# Patient Record
Sex: MALE | Race: BLACK OR AFRICAN AMERICAN | Employment: OTHER | URBAN - METROPOLITAN AREA
[De-identification: names, ages, dates, MRNs, and addresses within clinical notes are randomized per-mention and may not be internally consistent; named-entity substitution may affect disease eponyms.]

---

## 2020-08-12 ENCOUNTER — HOSPITAL ENCOUNTER (EMERGENCY)
Age: 65
Discharge: HOME | End: 2020-08-12
Attending: EMERGENCY MEDICINE
Payer: MEDICAID

## 2020-08-12 VITALS
WEIGHT: 200 LBS | DIASTOLIC BLOOD PRESSURE: 100 MMHG | HEART RATE: 107 BPM | RESPIRATION RATE: 16 BRPM | HEIGHT: 74 IN | TEMPERATURE: 98 F | BODY MASS INDEX: 25.67 KG/M2 | SYSTOLIC BLOOD PRESSURE: 139 MMHG | OXYGEN SATURATION: 98 %

## 2020-08-12 DIAGNOSIS — M54.41 ACUTE RIGHT-SIDED LOW BACK PAIN WITH RIGHT-SIDED SCIATICA: Primary | ICD-10-CM

## 2020-08-12 DIAGNOSIS — R03.0 ELEVATED BLOOD PRESSURE READING: ICD-10-CM

## 2020-08-12 PROCEDURE — 99282 EMERGENCY DEPT VISIT SF MDM: CPT

## 2020-08-12 RX ORDER — METHOCARBAMOL 500 MG/1
500 TABLET, FILM COATED ORAL 4 TIMES DAILY
Qty: 12 TAB | Refills: 0 | Status: SHIPPED | OUTPATIENT
Start: 2020-08-12

## 2020-08-12 RX ORDER — NAPROXEN 500 MG/1
500 TABLET ORAL 2 TIMES DAILY WITH MEALS
Qty: 20 TAB | Refills: 0 | Status: SHIPPED | OUTPATIENT
Start: 2020-08-12 | End: 2020-08-22

## 2020-08-12 NOTE — ED PROVIDER NOTES
EMERGENCY DEPARTMENT HISTORY AND PHYSICAL EXAM    7:14 PM      Date: 8/12/2020  Patient Name: Bogdan Levi    History of Presenting Illness     Chief Complaint   Patient presents with    Back Pain    Hip Pain       History Provided By: Patient    Chief Complaint: right low back pain, right hip pain  Duration: 2-3 Days  Timing:  Acute  Location:   Quality: Aching  Severity: Mild  Modifying Factors: none  Associated Symptoms: denies any other associated signs or symptoms      Additional History (Context):Adrien Gonzalez is a 75 y.o. male who presents to the emergency department for evaluation of right lower back pain and right hip pain for the past 2 or 3 days. Patient states he was moving some heavy furniture around and the pain began shortly after that. Pain is worse with bending and ambulating. Patient reports no history of similar pains. No associated perianal numbness or incontinence of bowels or bladder. No associated fevers or chills, nausea or vomiting, focal weakness or numbness, abdominal pain, URI, cough, chest pain, shortness of breath, or urinary symptoms. No history of diabetes or IV drug use. Patient states he feels well otherwise. No treatments prior to arrival.     PCP:  Tin Reddy MD      Current Outpatient Medications   Medication Sig Dispense Refill    methocarbamoL (Robaxin) 500 mg tablet Take 1 Tab by mouth four (4) times daily. 12 Tab 0    naproxen (Naprosyn) 500 mg tablet Take 1 Tab by mouth two (2) times daily (with meals) for 10 days. 20 Tab 0       Past History     Past Medical History:  History reviewed. No pertinent past medical history. Past Surgical History:  History reviewed. No pertinent surgical history. Family History:  History reviewed. No pertinent family history.     Social History:  Social History     Tobacco Use    Smoking status: Current Every Day Smoker    Smokeless tobacco: Never Used   Substance Use Topics    Alcohol use: Not on file    Drug use: Not on file       Allergies: Allergies   Allergen Reactions    Aspirin Unknown (comments)         Review of Systems     Review of Systems   Constitutional: Negative for chills and fever. HENT: Negative for congestion, rhinorrhea and sore throat. Respiratory: Negative for cough and shortness of breath. Cardiovascular: Negative for chest pain. Gastrointestinal: Negative for abdominal pain, blood in stool, constipation, diarrhea, nausea and vomiting. Genitourinary: Negative for dysuria, frequency and hematuria. Musculoskeletal: Positive for back pain. Negative for myalgias. Skin: Negative for rash and wound. Neurological: Negative for dizziness and headaches. All other systems reviewed and are negative. Physical Exam     Visit Vitals  BP (!) 139/100 (BP 1 Location: Right arm, BP Patient Position: At rest)   Pulse (!) 107   Temp 98 °F (36.7 °C)   Resp 16   Ht 6' 2\" (1.88 m)   Wt 90.7 kg (200 lb)   SpO2 98%   BMI 25.68 kg/m²       Physical Exam  Vitals signs and nursing note reviewed. Constitutional:       General: He is not in acute distress. Appearance: He is well-developed. He is not diaphoretic. Comments: Well-appearing, nontoxic   HENT:      Head: Normocephalic and atraumatic. Eyes:      Conjunctiva/sclera: Conjunctivae normal.   Neck:      Musculoskeletal: Normal range of motion and neck supple. Cardiovascular:      Rate and Rhythm: Regular rhythm. Tachycardia present. Heart sounds: Normal heart sounds. Pulmonary:      Effort: Pulmonary effort is normal. No respiratory distress. Breath sounds: Normal breath sounds. Chest:      Chest wall: No tenderness. Musculoskeletal: Normal range of motion. General: No swelling, tenderness, deformity or signs of injury. Right lower leg: No edema. Left lower leg: No edema. Comments: Positive straight leg raise on right. Negative straight leg raise on left.   Right lower back, right hip, and right buttock are tender to palpation. No overlying erythema, edema, ecchymosis, or rash. Patient ambulatory, moving bilateral upper and lower extremities with full range of motion. Slightly decreased strength secondary to pain in the right lower back, hip, and buttock. Intact distal neurovascular status   Skin:     General: Skin is warm and dry. Neurological:      Mental Status: He is alert and oriented to person, place, and time. Diagnostic Study Results     Labs -  No results found for this or any previous visit (from the past 12 hour(s)). Radiologic Studies -   No results found. Medical Decision Making   I am the first provider for this patient. I reviewed the vital signs, available nursing notes, past medical history, past surgical history, family history and social history. Vital Signs-Reviewed the patient's vital signs. Pulse Oximetry Analysis -  98% on room air (Interpretation)    Records Reviewed: Nursing Notes and Old Medical Records (Time of Review: 7:14 PM)    ED Course: Progress Notes, Reevaluation, and Consults:    Provider Notes (Medical Decision Making):   Differential Diagnosis: Musculoskeletal pain, myofascial strain/sprain, muscle spasm, spondylolisthesis, spondylosis, DJD, OA, sciatica    Plan:  Pt presents ambulatory, moving BLE in NAD with elevated diastolic blood pressure and mild tachycardia. Otherwise unremarkable vitals. He appears well and nontoxic. No red flags such as saddle paresthesias, weakness, bladder/bowel dysfunction, IVDA, DM, or fever - very low suspicion for epidural abscess, cauda equina, or spinal cord injury. Do not feel that emergent imaging is warranted at this time. Exam and HPI consistent with lumbosacral radiculopathy/sciatica, likely exacerbated by recent heavy lifting. Will trial outpatient Naprosyn and Robaxin. Patient reports aspirin makes him feel bad, but it is not a true allergy.   Explained to patient that this pain may take a few weeks to completely resolve. At this time, patient is stable and appropriate for discharge home. Patient demonstrates understanding of current diagnoses and is in agreement with the treatment plan. They are advised that while the likelihood of serious underlying condition is low at this point given the evaluation performed today, we cannot fully rule it out. They are advised to immediately return with any new symptoms or worsening of current condition. All questions have been answered. Patient is given educational material regarding their diagnoses, including danger symptoms and when to return to the ED. Diagnosis     Clinical Impression:   1. Acute right-sided low back pain with right-sided sciatica    2. Elevated blood pressure reading        Disposition:DC Home    Follow-up Information     Follow up With Specialties Details Why Contact Info    Bry Verduzco MD Family Medicine Call in 2 days  [facility] [address] [place] [address] EMERGENCY DEPT Emergency Medicine Go to As needed, If symptoms worsen [address]  [phone]           Patient's Medications   Start Taking    METHOCARBAMOL (ROBAXIN) 500 MG TABLET    Take 1 Tab by mouth four (4) times daily. NAPROXEN (NAPROSYN) 500 MG TABLET    Take 1 Tab by mouth two (2) times daily (with meals) for 10 days. Continue Taking    No medications on file   These Medications have changed    No medications on file   Stop Taking    No medications on file     _______________________________    This note was dictated utilizing voice recognition software which may lead to typographical errors. I apologize in advance if the situation occurs. If questions arise please do not hesitate to contact me or call our department.   Sandi Dinh PA-C

## 2020-08-12 NOTE — DISCHARGE INSTRUCTIONS
Patient Education     Please return immediately to the Emergency Room for re-evaluation if you are not improving, develop any new symptoms, or develop worsening of current symptoms! If you have been prescribed a medication and are unable to take this medication for any reason, please return to the Emergency Department for further evaluation! If you have been referred for follow-up to a specialist, but are unable to follow-up and your symptoms are either not improving or are worsening, please return to the Emergency Department for further evaluation! Back Pain, Emergency or Urgent Symptoms: Care Instructions  Your Care Instructions     Many people have back pain at one time or another. In most cases, pain gets better with self-care that includes over-the-counter pain medicine, ice, heat, and exercises. Unless you have symptoms of a severe injury or heart attack, you may be able to give yourself a few days before you call a doctor. But some back problems are very serious. Do not ignore symptoms that need to be checked right away. Follow-up care is a key part of your treatment and safety. Be sure to make and go to all appointments, and call your doctor if you are having problems. It's also a good idea to know your test results and keep a list of the medicines you take. How can you care for yourself at home? · Sit or lie in positions that are most comfortable and that reduce your pain. Try one of these positions when you lie down:  ? Lie on your back with your knees bent and supported by large pillows. ? Lie on the floor with your legs on the seat of a sofa or chair. ? Lie on your side with your knees and hips bent and a pillow between your legs. ? Lie on your stomach if it does not make pain worse. · Do not sit up in bed, and avoid soft couches and twisted positions. Bed rest can help relieve pain at first, but it delays healing. Avoid bed rest after the first day.   · Change positions every 30 minutes. If you must sit for long periods of time, take breaks from sitting. Get up and walk around, or lie flat. · Try using a heating pad on a low or medium setting, for 15 to 20 minutes every 2 or 3 hours. Try a warm shower in place of one session with the heating pad. You can also buy single-use heat wraps that last up to 8 hours. You can also try ice or cold packs on your back for 10 to 20 minutes at a time, several times a day. (Put a thin cloth between the ice pack and your skin.) This reduces pain and makes it easier to be active and exercise. · Take pain medicines exactly as directed. ? If the doctor gave you a prescription medicine for pain, take it as prescribed. ? If you are not taking a prescription pain medicine, ask your doctor if you can take an over-the-counter medicine. When should you call for help? [id number] anytime you think you may need emergency care. For example, call if:  · You are unable to move a leg at all. · You have back pain with severe belly pain. · You have symptoms of a heart attack. These may include:  ? Chest pain or pressure, or a strange feeling in the chest.  ? Sweating. ? Shortness of breath. ? Nausea or vomiting. ? Pain, pressure, or a strange feeling in the back, neck, jaw, or upper belly or in one or both shoulders or arms. ? Lightheadedness or sudden weakness. ? A fast or irregular heartbeat. After you call 911, the  may tell you to chew 1 adult-strength or 2 to 4 low-dose aspirin. Wait for an ambulance. Do not try to drive yourself. Call your doctor now or seek immediate medical care if:  · You have new or worse symptoms in your arms, legs, chest, belly, or buttocks. Symptoms may include:  ? Numbness or tingling. ? Weakness. ? Pain. · You lose bladder or bowel control. · You have back pain and:  ? You have injured your back while lifting or doing some other activity.  Call if the pain is severe, has not gone away after 1 or 2 days, and you cannot do your normal daily activities. ? You have had a back injury before that needed treatment. ? Your pain has lasted longer than 4 weeks. ? You have had weight loss you cannot explain. ? You have a fever. ? You are age 48 or older. ? You have cancer now or have had it before. Watch closely for changes in your health, and be sure to contact your doctor if you are not getting better as expected. Where can you learn more? Go to http://dixie-verito.info/  Enter D066 in the search box to learn more about \"Back Pain, Emergency or Urgent Symptoms: Care Instructions. \"  Current as of: June 26, 2019               Content Version: 12.5  © 9483-4675 [employer] Care instructions adapted under license by [employer] (which disclaims liability or warranty for this information). If you have questions about a medical condition or this instruction, always ask your healthcare professional. [place] [address] any warranty or liability for your use of this information. Patient Education        Low Back Pain: Exercises  Introduction  Here are some examples of exercises for you to try. The exercises may be suggested for a condition or for rehabilitation. Start each exercise slowly. Ease off the exercises if you start to have pain. You will be told when to start these exercises and which ones will work best for you. How to do the exercises  Press-up   1. Lie on your stomach, supporting your body with your forearms. 2. Press your elbows down into the floor to raise your upper back. As you do this, relax your stomach muscles and allow your back to arch without using your back muscles. As your press up, do not let your hips or pelvis come off the floor. 3. Hold for 15 to 30 seconds, then relax. 4. Repeat 2 to 4 times. Alternate arm and leg (bird dog) exercise   Do this exercise slowly.  Try to keep your body straight at all times, and do not let one hip drop lower than the other. 1. Start on the floor, on your hands and knees. 2. Tighten your belly muscles. 3. Raise one leg off the floor, and hold it straight out behind you. Be careful not to let your hip drop down, because that will twist your trunk. 4. Hold for about 6 seconds, then lower your leg and switch to the other leg. 5. Repeat 8 to 12 times on each leg. 6. Over time, work up to holding for 10 to 30 seconds each time. 7. If you feel stable and secure with your leg raised, try raising the opposite arm straight out in front of you at the same time. Knee-to-chest exercise   1. Lie on your back with your knees bent and your feet flat on the floor. 2. Bring one knee to your chest, keeping the other foot flat on the floor (or keeping the other leg straight, whichever feels better on your lower back). 3. Keep your lower back pressed to the floor. Hold for at least 15 to 30 seconds. 4. Relax, and lower the knee to the starting position. 5. Repeat with the other leg. Repeat 2 to 4 times with each leg. 6. To get more stretch, put your other leg flat on the floor while pulling your knee to your chest.    Curl-ups   1. Lie on the floor on your back with your knees bent at a 90-degree angle. Your feet should be flat on the floor, about 12 inches from your buttocks. 2. Cross your arms over your chest. If this bothers your neck, try putting your hands behind your neck (not your head), with your elbows spread apart. 3. Slowly tighten your belly muscles and raise your shoulder blades off the floor. 4. Keep your head in line with your body, and do not press your chin to your chest.  5. Hold this position for 1 or 2 seconds, then slowly lower yourself back down to the floor. 6. Repeat 8 to 12 times. Pelvic tilt exercise   1. Lie on your back with your knees bent. 2. \"Brace\" your stomach. This means to tighten your muscles by pulling in and imagining your belly button moving toward your spine.  You should feel like your back is pressing to the floor and your hips and pelvis are rocking back. 3. Hold for about 6 seconds while you breathe smoothly. 4. Repeat 8 to 12 times. Heel dig bridging   1. Lie on your back with both knees bent and your ankles bent so that only your heels are digging into the floor. Your knees should be bent about 90 degrees. 2. Then push your heels into the floor, squeeze your buttocks, and lift your hips off the floor until your shoulders, hips, and knees are all in a straight line. 3. Hold for about 6 seconds as you continue to breathe normally, and then slowly lower your hips back down to the floor and rest for up to 10 seconds. 4. Do 8 to 12 repetitions. Hamstring stretch in doorway   1. Lie on your back in a doorway, with one leg through the open door. 2. Slide your leg up the wall to straighten your knee. You should feel a gentle stretch down the back of your leg. 3. Hold the stretch for at least 15 to 30 seconds. Do not arch your back, point your toes, or bend either knee. Keep one heel touching the floor and the other heel touching the wall. 4. Repeat with your other leg. 5. Do 2 to 4 times for each leg. Hip flexor stretch   1. Kneel on the floor with one knee bent and one leg behind you. Place your forward knee over your foot. Keep your other knee touching the floor. 2. Slowly push your hips forward until you feel a stretch in the upper thigh of your rear leg. 3. Hold the stretch for at least 15 to 30 seconds. Repeat with your other leg. 4. Do 2 to 4 times on each side. Wall sit   1. Stand with your back 10 to 12 inches away from a wall. 2. Lean into the wall until your back is flat against it. 3. Slowly slide down until your knees are slightly bent, pressing your lower back into the wall. 4. Hold for about 6 seconds, then slide back up the wall. 5. Repeat 8 to 12 times. Follow-up care is a key part of your treatment and safety.  Be sure to make and go to all appointments, and call your doctor if you are having problems. It's also a good idea to know your test results and keep a list of the medicines you take. Where can you learn more? Go to http://www.gray.com/  Enter U388 in the search box to learn more about \"Low Back Pain: Exercises. \"  Current as of: March 2, 2020               Content Version: 12.5  © 2006-2020 [employer]. Care instructions adapted under license by [employer] (which disclaims liability or warranty for this information). If you have questions about a medical condition or this instruction, always ask your healthcare professional. [address] any warranty or liability for your use of this information.

## 2022-05-02 ENCOUNTER — TELEPHONE (OUTPATIENT)
Dept: SURGERY | Age: 67
End: 2022-05-02

## 2022-05-02 NOTE — TELEPHONE ENCOUNTER
Trying to reach patient to schedule appt for right inguinal hernia. Patient was not accepting calls so could not leave a message.

## 2022-05-23 ENCOUNTER — HOSPITAL ENCOUNTER (OUTPATIENT)
Dept: LAB | Age: 67
Discharge: HOME | End: 2022-05-23

## 2022-05-23 ENCOUNTER — HOSPITAL ENCOUNTER (OUTPATIENT)
Dept: LAB | Age: 67
Discharge: HOME | End: 2022-05-23
Payer: MEDICAID

## 2022-05-23 ENCOUNTER — OFFICE VISIT (OUTPATIENT)
Dept: SURGERY | Age: 67
End: 2022-05-23
Payer: MEDICAID

## 2022-05-23 VITALS
BODY MASS INDEX: 24 KG/M2 | HEIGHT: 74 IN | OXYGEN SATURATION: 98 % | HEART RATE: 111 BPM | SYSTOLIC BLOOD PRESSURE: 167 MMHG | DIASTOLIC BLOOD PRESSURE: 96 MMHG | RESPIRATION RATE: 16 BRPM | TEMPERATURE: 97.4 F | WEIGHT: 187 LBS

## 2022-05-23 DIAGNOSIS — Z01.810 PREOP CARDIOVASCULAR EXAM: Primary | ICD-10-CM

## 2022-05-23 DIAGNOSIS — Z01.810 PREOP CARDIOVASCULAR EXAM: ICD-10-CM

## 2022-05-23 DIAGNOSIS — K40.90 RIGHT INGUINAL HERNIA: ICD-10-CM

## 2022-05-23 DIAGNOSIS — Z72.0 TOBACCO ABUSE: ICD-10-CM

## 2022-05-23 LAB — SENTARA SPECIMEN COL,SENBCF: NORMAL

## 2022-05-23 PROCEDURE — 99001 SPECIMEN HANDLING PT-LAB: CPT

## 2022-05-23 PROCEDURE — 99204 OFFICE O/P NEW MOD 45 MIN: CPT | Performed by: SURGERY

## 2022-05-23 PROCEDURE — 93005 ELECTROCARDIOGRAM TRACING: CPT

## 2022-05-23 RX ORDER — RISPERIDONE 2 MG/1
TABLET, FILM COATED ORAL
COMMUNITY

## 2022-05-23 RX ORDER — LOSARTAN POTASSIUM 50 MG/1
50 TABLET ORAL DAILY
COMMUNITY

## 2022-05-23 NOTE — PROGRESS NOTES
CC:   Chief Complaint   Patient presents with    Possible Hernia     right sided        Assessment:    ICD-10-CM ICD-9-CM    1. Preop cardiovascular exam  Z01.810 V72.81 SCHEDULE SURGERY      XR CHEST PA LAT      EKG, 12 LEAD, INITIAL      CBC WITH AUTOMATED DIFF      METABOLIC PANEL, COMPREHENSIVE   2. Right inguinal hernia  K40.90 550.90    3. Tobacco abuse  Z72.0 305.1        Plan: He does have a large right inguinal hernia that is reducible today. He is interested in fixing the hernia. Recommended open right inguinal hernia repair with mesh. The risks and benefits of the procedure were reviewed with the patient including infection, bleeding, need for repeat procedure, injury to surrounding structures, chronic pain, mesh infection and failure were discussed. Questions were answered and consent was obtained. Encouraged patient on tobacco cessation and post operative restrictions. He has no desire to quit. Risk of wound infection, mesh failure and hernia recurrence are increased in smokers. He verbalized understanding of this and wishes to proceed. He states he will have no problem following post operative wound care and lifting restrictions. We will need to obtain labs, EKG and chest xray prior to surgery. He agrees with the plan. HPI:  Shreya Ang is a 74 y.o. male who is referred by Ministerio Richards,* for right inguinal hernia. He is physically active and noticed a bulge in his right groin for the last 7 months. He does not feel like it has increased in size. The hernia is reducible and does cause some minor discomfort. No testicular pain, no shooting pain. No changes to his bowel or bladder habits. He had a similar hernia on the left 20 years ago that was fixed open with mesh and did well. He denies any other surgery in the past. He admits to smoking approximately 15 cigarettes a day for the last 12 years. He has no desire to quit smoking. His weight is stable. Allergies:   Allergies Allergen Reactions    Aspirin Unknown (comments)       Medication Review:  Current Outpatient Medications on File Prior to Visit   Medication Sig Dispense Refill    risperiDONE (RisperDAL) 2 mg tablet 1 tablet      losartan (COZAAR) 50 mg tablet Take 50 mg by mouth daily.  methocarbamoL (Robaxin) 500 mg tablet Take 1 Tab by mouth four (4) times daily. (Patient not taking: Reported on 5/23/2022) 12 Tab 0     No current facility-administered medications on file prior to visit. Systems Review:  Review of Systems   Constitutional: Negative for activity change, appetite change, chills, fatigue, fever and unexpected weight change. Respiratory: Negative for chest tightness and shortness of breath. Cardiovascular: Negative for chest pain and palpitations. Gastrointestinal: Negative for abdominal distention, abdominal pain, anal bleeding, blood in stool, constipation, diarrhea and nausea. Musculoskeletal: Negative for arthralgias and back pain. Skin: Negative for rash and wound. Hematological: Negative for adenopathy. Does not bruise/bleed easily. PMH:  Past Medical History:   Diagnosis Date    Hypertension     Mental and behavioral problem        Surgical History:  Past Surgical History:   Procedure Laterality Date    HX HERNIA REPAIR Left     20 years ago       Social History:  Social History     Socioeconomic History    Marital status: SINGLE   Tobacco Use    Smoking status: Current Every Day Smoker    Smokeless tobacco: Never Used   Vaping Use    Vaping Use: Never used   Substance and Sexual Activity    Alcohol use: Never    Drug use: Never       Family History:  History reviewed. No pertinent family history.     Hospital Outpatient Visit on 05/23/2022   Component Date Value Ref Range Status    Ventricular Rate 05/23/2022 99  BPM Preliminary    Atrial Rate 05/23/2022 99  BPM Preliminary    P-R Interval 05/23/2022 140  ms Preliminary    QRS Duration 05/23/2022 74  ms Preliminary    Q-T Interval 05/23/2022 356  ms Preliminary    QTC Calculation (Bezet) 05/23/2022 456  ms Preliminary    Calculated P Axis 05/23/2022 73  degrees Preliminary    Calculated R Axis 05/23/2022 1  degrees Preliminary    Calculated T Axis 05/23/2022 54  degrees Preliminary    Diagnosis 05/23/2022    Preliminary                    Value:Normal sinus rhythm  Normal ECG  No previous ECGs available     Hospital Outpatient Visit on 05/23/2022   Component Date Value Ref Range Status    [facility] SPECIMEN COL 05/23/2022 Specimens collected/sent to [facility]    Final       No image results found. Physical Exam:  Visit Vitals  BP (!) 167/96 (BP 1 Location: Left arm)   Pulse (!) 111   Temp 97.4 °F (36.3 °C)   Resp 16   Ht 6' 2\" (1.88 m)   Wt 84.8 kg (187 lb)   SpO2 98%   BMI 24.01 kg/m²    BMI: Body mass index is 24.01 kg/m². Physical Exam  Constitutional:       Appearance: Normal appearance. He is normal weight. Eyes:      Extraocular Movements: Extraocular movements intact. Conjunctiva/sclera: Conjunctivae normal.      Pupils: Pupils are equal, round, and reactive to light. Cardiovascular:      Rate and Rhythm: Normal rate and regular rhythm. Pulmonary:      Effort: Pulmonary effort is normal.      Breath sounds: Normal breath sounds. Abdominal:      General: Abdomen is flat. Bowel sounds are normal. There is no distension. Palpations: Abdomen is soft. Tenderness: There is no abdominal tenderness. There is no guarding. Hernia: A hernia is present. Comments: Larger reducible right inguinal hernia, left inguinal incision well healed   Skin:     General: Skin is warm and dry. Neurological:      General: No focal deficit present. Mental Status: He is alert and oriented to person, place, and time. Mental status is at baseline. Psychiatric:         Mood and Affect: Mood normal.         Behavior: Behavior normal.         Thought Content:  Thought content normal. Judgment: Judgment normal.         I have reviewed the information entered by the clinical staff and/or patient and verified it as accurate or edited where necessary.      Electronically signed by:    Maria Luisa Corcoran DO, MPH

## 2022-05-23 NOTE — PROGRESS NOTES
Jayant Mcwilliams is a 74 y.o. male (: 1955) presenting to address:    Chief Complaint   Patient presents with    Possible Hernia     right sided       Medication list and allergies have been reviewed with Jayant Mcwilliams and updated as of today's date. I have gone over all Medical, Surgical and Social History with Jayant Mcwilliams and updated/added the information accordingly.

## 2022-05-23 NOTE — PATIENT INSTRUCTIONS
..If you have any questions or concerns about today's appointment, the verbal and/or written instructions you were given for follow up care, please call our office at [phone]. [facility] [place]  [id number] [place], [place] [address] [address]    [phone] office  [phone] fax      [facility] located at [facility], [address] [place] [address] Road [address]) [phone]  To get lab work done and EKG    Please have Chest X Ray done at [facility] (Outpatient Registration) - No appointment is necessary    [address]   [facility], [address]  [phone]    Before Surgery Instructions:   1) You must have someone available to drive you to and from your procedure and stay with you for the first 24 hours. 2) It is very important that you have nothing to eat or drink after midnight the night before your surgery. This includes chewing gum or sucking on hard candy. Take only heart, blood pressure and cholesterol medications the morning of surgery with only a sip of water. 3) Please stop taking Plavix 5-7 days prior to your surgery with prescribing physician approval.  Stop taking Coumadin 5 days prior to your surgery with prescribing physician approval.  Stop taking all Aspirin or Aspirin containing products 7 days prior to your surgery. Stop taking Advil, Motrin, Aleve, and etc. 3 days prior to your surgery. 4) If you take any diabetic medications please consult with your primary care physician on how to take them on the day of your surgery  5) Please stop all Herbal products 2 weeks prior to your surgery. 6) Please arrive at the hospital 2 hours prior to your surgery, unless you have been otherwise instructed. 7) Patients having an operation on their colon will be given a separate instruction sheet on their Bowel Prep.   8) For any pre-operative work up check in at the main entrance to [facility], and then go to Patient Registration. These studies are done on a walk in basis they are open from 7:00am to 5:00pm Monday through Friday. 9) A urine drug screen will be performed on the day of surgery. Please be advised if your drug screen test result is positive for any illegal substances your surgery is subject to cancellation. 10) Please wash your surgical site the morning of your surgery with soap and water. 11) If you are of child bearing age you will have pregnancy test done the morning of your surgery as soon as you arrive. 12) You're surgery time is subject to change. At times this is necessary due to equipment or staffing needs. Please be advised it's your responsibility to notify our office of any changes to your healthcare coverage. Failure to notify our office of any changes to your health care coverage may result in denial of payment by your health insurance for all incurred services and you would be responsible for payment for all incurred services. After Surgery Instructions: You will need to be seen in the office for a follow-up visit 7-14 days after your surgery. Please call after you have had the procedure to make this appointment. Unless otherwise instructed, you may remove your outer bandage and shower 48 hours after your surgery. If you develop a fever greater than 101, have any significant drainage, bleeding, swelling and/or pus of the wound. Please call our office immediately. Surgery Date and Time:   Tuesday, June 21, 2022 at 11:00am    Please enter [facility] main entrance on the first floor and go to Patient Registration. Once registered, a member of our team will escort you to the second floor. Please check in by 9:00am the day of your surgery. You may contact Obdulia Aburto with any questions at [phone].

## 2022-05-23 NOTE — H&P (VIEW-ONLY)
CC:   Chief Complaint   Patient presents with    Possible Hernia     right sided        Assessment:    ICD-10-CM ICD-9-CM    1. Preop cardiovascular exam  Z01.810 V72.81 SCHEDULE SURGERY      XR CHEST PA LAT      EKG, 12 LEAD, INITIAL      CBC WITH AUTOMATED DIFF      METABOLIC PANEL, COMPREHENSIVE   2. Right inguinal hernia  K40.90 550.90    3. Tobacco abuse  Z72.0 305.1        Plan: He does have a large right inguinal hernia that is reducible today. He is interested in fixing the hernia. Recommended open right inguinal hernia repair with mesh. The risks and benefits of the procedure were reviewed with the patient including infection, bleeding, need for repeat procedure, injury to surrounding structures, chronic pain, mesh infection and failure were discussed. Questions were answered and consent was obtained. Encouraged patient on tobacco cessation and post operative restrictions. He has no desire to quit. Risk of wound infection, mesh failure and hernia recurrence are increased in smokers. He verbalized understanding of this and wishes to proceed. He states he will have no problem following post operative wound care and lifting restrictions. We will need to obtain labs, EKG and chest xray prior to surgery. He agrees with the plan. HPI:  Jessica Yañez is a 74 y.o. male who is referred by Janet Diaz,* for right inguinal hernia. He is physically active and noticed a bulge in his right groin for the last 7 months. He does not feel like it has increased in size. The hernia is reducible and does cause some minor discomfort. No testicular pain, no shooting pain. No changes to his bowel or bladder habits. He had a similar hernia on the left 20 years ago that was fixed open with mesh and did well. He denies any other surgery in the past. He admits to smoking approximately 15 cigarettes a day for the last 12 years. He has no desire to quit smoking. His weight is stable. Allergies:   Allergies Allergen Reactions    Aspirin Unknown (comments)       Medication Review:  Current Outpatient Medications on File Prior to Visit   Medication Sig Dispense Refill    risperiDONE (RisperDAL) 2 mg tablet 1 tablet      losartan (COZAAR) 50 mg tablet Take 50 mg by mouth daily.  methocarbamoL (Robaxin) 500 mg tablet Take 1 Tab by mouth four (4) times daily. (Patient not taking: Reported on 5/23/2022) 12 Tab 0     No current facility-administered medications on file prior to visit. Systems Review:  Review of Systems   Constitutional: Negative for activity change, appetite change, chills, fatigue, fever and unexpected weight change. Respiratory: Negative for chest tightness and shortness of breath. Cardiovascular: Negative for chest pain and palpitations. Gastrointestinal: Negative for abdominal distention, abdominal pain, anal bleeding, blood in stool, constipation, diarrhea and nausea. Musculoskeletal: Negative for arthralgias and back pain. Skin: Negative for rash and wound. Hematological: Negative for adenopathy. Does not bruise/bleed easily. PMH:  Past Medical History:   Diagnosis Date    Hypertension     Mental and behavioral problem        Surgical History:  Past Surgical History:   Procedure Laterality Date    HX HERNIA REPAIR Left     20 years ago       Social History:  Social History     Socioeconomic History    Marital status: SINGLE   Tobacco Use    Smoking status: Current Every Day Smoker    Smokeless tobacco: Never Used   Vaping Use    Vaping Use: Never used   Substance and Sexual Activity    Alcohol use: Never    Drug use: Never       Family History:  History reviewed. No pertinent family history.     Hospital Outpatient Visit on 05/23/2022   Component Date Value Ref Range Status    Ventricular Rate 05/23/2022 99  BPM Preliminary    Atrial Rate 05/23/2022 99  BPM Preliminary    P-R Interval 05/23/2022 140  ms Preliminary    QRS Duration 05/23/2022 74  ms Preliminary    Q-T Interval 05/23/2022 356  ms Preliminary    QTC Calculation (Bezet) 05/23/2022 456  ms Preliminary    Calculated P Axis 05/23/2022 73  degrees Preliminary    Calculated R Axis 05/23/2022 1  degrees Preliminary    Calculated T Axis 05/23/2022 54  degrees Preliminary    Diagnosis 05/23/2022    Preliminary                    Value:Normal sinus rhythm  Normal ECG  No previous ECGs available     Hospital Outpatient Visit on 05/23/2022   Component Date Value Ref Range Status    [facility] SPECIMEN COL 05/23/2022 Specimens collected/sent to [facility]    Final       No image results found. Physical Exam:  Visit Vitals  BP (!) 167/96 (BP 1 Location: Left arm)   Pulse (!) 111   Temp 97.4 °F (36.3 °C)   Resp 16   Ht 6' 2\" (1.88 m)   Wt 84.8 kg (187 lb)   SpO2 98%   BMI 24.01 kg/m²    BMI: Body mass index is 24.01 kg/m². Physical Exam  Constitutional:       Appearance: Normal appearance. He is normal weight. Eyes:      Extraocular Movements: Extraocular movements intact. Conjunctiva/sclera: Conjunctivae normal.      Pupils: Pupils are equal, round, and reactive to light. Cardiovascular:      Rate and Rhythm: Normal rate and regular rhythm. Pulmonary:      Effort: Pulmonary effort is normal.      Breath sounds: Normal breath sounds. Abdominal:      General: Abdomen is flat. Bowel sounds are normal. There is no distension. Palpations: Abdomen is soft. Tenderness: There is no abdominal tenderness. There is no guarding. Hernia: A hernia is present. Comments: Larger reducible right inguinal hernia, left inguinal incision well healed   Skin:     General: Skin is warm and dry. Neurological:      General: No focal deficit present. Mental Status: He is alert and oriented to person, place, and time. Mental status is at baseline. Psychiatric:         Mood and Affect: Mood normal.         Behavior: Behavior normal.         Thought Content:  Thought content normal. Judgment: Judgment normal.         I have reviewed the information entered by the clinical staff and/or patient and verified it as accurate or edited where necessary.      Electronically signed by:    Abiel Mccormick DO, MPH

## 2022-05-23 NOTE — LETTER
5/23/2022    Patient: Karlee Marie   YOB: 1955   Date of Visit: 5/23/2022     Jerome Steele MD  [address] [place] [address]  Via Fax: [phone]     Werner Workman NP  [address] [id number]  Via Fax: [phone]    Dear MD Werner Handy NP,      Thank you for referring Mr. Karlee Marie to [facility]  for evaluation. My notes for this consultation are attached. If you have questions, please do not hesitate to call me. I look forward to following your patient along with you.       Sincerely,    Jeffrey Peña, [address]

## 2022-05-24 LAB
A-G RATIO,AGRAT: 1.9 RATIO (ref 1.1–2.6)
ABSOLUTE LYMPHOCYTE COUNT, 10803: 1.7 K/UL (ref 1–4.8)
ALBUMIN SERPL-MCNC: 4.2 G/DL (ref 3.5–5)
ALP SERPL-CCNC: 57 U/L (ref 40–125)
ALT SERPL-CCNC: 12 U/L (ref 5–40)
ANION GAP SERPL CALC-SCNC: 9 MMOL/L (ref 3–15)
AST SERPL W P-5'-P-CCNC: 12 U/L (ref 10–37)
ATRIAL RATE: 99 BPM
BASOPHILS # BLD: 0 K/UL (ref 0–0.2)
BASOPHILS NFR BLD: 1 % (ref 0–2)
BILIRUB SERPL-MCNC: 0.3 MG/DL (ref 0.2–1.2)
BUN SERPL-MCNC: 11 MG/DL (ref 6–22)
CALCIUM SERPL-MCNC: 8.9 MG/DL (ref 8.4–10.5)
CALCULATED P AXIS, ECG09: 73 DEGREES
CALCULATED R AXIS, ECG10: 1 DEGREES
CALCULATED T AXIS, ECG11: 54 DEGREES
CHLORIDE SERPL-SCNC: 103 MMOL/L (ref 98–110)
CO2 SERPL-SCNC: 27 MMOL/L (ref 20–32)
CREAT SERPL-MCNC: 0.9 MG/DL (ref 0.8–1.6)
DIAGNOSIS, 93000: NORMAL
EOSINOPHIL # BLD: 0.1 K/UL (ref 0–0.5)
EOSINOPHIL NFR BLD: 2 % (ref 0–6)
ERYTHROCYTE [DISTWIDTH] IN BLOOD BY AUTOMATED COUNT: 14.3 % (ref 10–15.5)
GLOBULIN,GLOB: 2.2 G/DL (ref 2–4)
GLOMERULAR FILTRATION RATE: >60 ML/MIN/1.73 SQ.M.
GLUCOSE SERPL-MCNC: 87 MG/DL (ref 70–99)
GRANULOCYTES,GRANS: 63 % (ref 40–75)
HCT VFR BLD AUTO: 42.2 % (ref 37.8–52.2)
HGB BLD-MCNC: 14 G/DL (ref 12.6–17.1)
LYMPHOCYTES, LYMLT: 27 % (ref 20–45)
MCH RBC QN AUTO: 31 PG (ref 26–34)
MCHC RBC AUTO-ENTMCNC: 33 G/DL (ref 31–36)
MCV RBC AUTO: 92 FL (ref 80–95)
MONOCYTES # BLD: 0.5 K/UL (ref 0.1–1)
MONOCYTES NFR BLD: 7 % (ref 3–12)
NEUTROPHILS # BLD AUTO: 4 K/UL (ref 1.8–7.7)
P-R INTERVAL, ECG05: 140 MS
PLATELET # BLD AUTO: 234 K/UL (ref 140–440)
PMV BLD AUTO: 10.1 FL (ref 9–13)
POTASSIUM SERPL-SCNC: 4.2 MMOL/L (ref 3.5–5.5)
PROT SERPL-MCNC: 6.4 G/DL (ref 6.2–8.1)
Q-T INTERVAL, ECG07: 356 MS
QRS DURATION, ECG06: 74 MS
QTC CALCULATION (BEZET), ECG08: 456 MS
RBC # BLD AUTO: 4.57 M/UL (ref 3.8–5.8)
SODIUM SERPL-SCNC: 139 MMOL/L (ref 133–145)
VENTRICULAR RATE, ECG03: 99 BPM
WBC # BLD AUTO: 6.4 K/UL (ref 4–11)

## 2022-05-31 ENCOUNTER — TELEPHONE (OUTPATIENT)
Dept: SURGERY | Age: 67
End: 2022-05-31

## 2022-05-31 NOTE — TELEPHONE ENCOUNTER
[facility] called stating Mr. Mehreen Bach is present for chest x ray ordered by Dr. Laryr Phelan however he don't have the order with him. Request to please fax order to [phone].

## 2022-06-15 NOTE — PERIOP NOTES
PRE-SURGICAL INSTRUCTIONS        Patient's Name:  Mayra Harper      EPPJM'I Date:  6/15/2022           Surgery Date:  6/21/2022                1. Do NOT eat or drink anything, including candy, gum, or ice chips after midnight on 6/20/2022, unless you have specific instructions from your surgeon or anesthesia provider to do so.  2. You may brush your teeth before coming to the hospital.  3. No smoking 24 hours prior to the day of surgery. 4. No alcohol 24 hours prior to the day of surgery. 5. No recreational drugs for one week prior to the day of surgery. 6. Leave all valuables, including money/purse, at home. 7. Remove all jewelry, nail polish,  no lotions powders, deodorant, or perfume/cologne/after shave on the skin. 8. Follow instruction for Hibiclens washes and CHG wipes from surgeon's office. 9. Glasses/contact lenses and dentures may be worn to the hospital.  They will be removed prior to surgery. 10. Call your doctor if symptoms of a cold or illness develop within 24-48 hours prior to your surgery. 11.  If you are having an outpatient procedure, please make arrangements for a responsible ADULT TO [address] [place] [address] and stay with you for 24 hours after your surgery. 12. ONE VISITOR in the hospital at this time for outpatient procedures. Exceptions may be made for surgical admissions, per nursing unit guidelines      Special Instructions:      Bring list of CURRENT medications. Bring covid vaccination card  Bring any pertinent legal medical records. Take these medications the morning of surgery with a sip of water: as directed by physician  Follow physician instructions about stopping anticoagulants. On the day of surgery, come in the main entrance of [facility]. Let the  at the desk know you are there for surgery. A staff member will come escort you to the surgical area on the second floor.     If you have any questions or concerns, please do not hesitate to call:     (Prior to the day of surgery) [facility] department:  [phone]   (Day of surgery) Pre-Op department:  [phone]    These surgical instructions were reviewed with patient and sister, Alissa Wong during the [facility] phone call.

## 2022-06-20 ENCOUNTER — ANESTHESIA EVENT (OUTPATIENT)
Dept: SURGERY | Age: 67
End: 2022-06-20
Payer: MEDICAID

## 2022-06-21 ENCOUNTER — ANESTHESIA (OUTPATIENT)
Dept: SURGERY | Age: 67
End: 2022-06-21
Payer: MEDICAID

## 2022-06-21 ENCOUNTER — HOSPITAL ENCOUNTER (OUTPATIENT)
Age: 67
Setting detail: OUTPATIENT SURGERY
Discharge: HOME | End: 2022-06-21
Attending: SURGERY | Admitting: SURGERY
Payer: MEDICAID

## 2022-06-21 VITALS
OXYGEN SATURATION: 98 % | SYSTOLIC BLOOD PRESSURE: 152 MMHG | WEIGHT: 176.5 LBS | RESPIRATION RATE: 14 BRPM | BODY MASS INDEX: 22.65 KG/M2 | HEART RATE: 74 BPM | DIASTOLIC BLOOD PRESSURE: 85 MMHG | TEMPERATURE: 96.9 F | HEIGHT: 74 IN

## 2022-06-21 DIAGNOSIS — Z98.890 S/P RIGHT INGUINAL HERNIA REPAIR: Primary | ICD-10-CM

## 2022-06-21 DIAGNOSIS — Z87.19 S/P RIGHT INGUINAL HERNIA REPAIR: Primary | ICD-10-CM

## 2022-06-21 LAB
AMPHET UR QL SCN: NEGATIVE
BARBITURATES UR QL SCN: NEGATIVE
BENZODIAZ UR QL: NEGATIVE
CANNABINOIDS UR QL SCN: NEGATIVE
COCAINE UR QL SCN: NEGATIVE
HDSCOM,HDSCOM: NORMAL
METHADONE UR QL: NEGATIVE
OPIATES UR QL: NEGATIVE
PCP UR QL: NEGATIVE

## 2022-06-21 PROCEDURE — 77030018548 HC SUT ETHBND2 J&J -B: Performed by: SURGERY

## 2022-06-21 PROCEDURE — 77030002933 HC SUT MCRYL J&J -A: Performed by: SURGERY

## 2022-06-21 PROCEDURE — 77030032020 HC SUPP SCROT 3M -A: Performed by: SURGERY

## 2022-06-21 PROCEDURE — 74011000250 HC RX REV CODE- 250: Performed by: NURSE ANESTHETIST, CERTIFIED REGISTERED

## 2022-06-21 PROCEDURE — 00830 ANES HERNIA RPR LWR ABD NOS: CPT | Performed by: NURSE ANESTHETIST, CERTIFIED REGISTERED

## 2022-06-21 PROCEDURE — 74011250636 HC RX REV CODE- 250/636: Performed by: NURSE ANESTHETIST, CERTIFIED REGISTERED

## 2022-06-21 PROCEDURE — 2709999900 HC NON-CHARGEABLE SUPPLY: Performed by: SURGERY

## 2022-06-21 PROCEDURE — 76060000033 HC ANESTHESIA 1 TO 1.5 HR: Performed by: SURGERY

## 2022-06-21 PROCEDURE — 77030012422 HC DRN WND COVD -A: Performed by: SURGERY

## 2022-06-21 PROCEDURE — 74011250637 HC RX REV CODE- 250/637: Performed by: NURSE ANESTHETIST, CERTIFIED REGISTERED

## 2022-06-21 PROCEDURE — 80307 DRUG TEST PRSMV CHEM ANLYZR: CPT

## 2022-06-21 PROCEDURE — 74011000272 HC RX REV CODE- 272: Performed by: SURGERY

## 2022-06-21 PROCEDURE — 77030018836 HC SOL IRR NACL ICUM -A: Performed by: SURGERY

## 2022-06-21 PROCEDURE — 77030034479 HC ADH SKN CLSR PRINEO J&J -B: Performed by: SURGERY

## 2022-06-21 PROCEDURE — 77030013079 HC BLNKT BAIR HGGR 3M -A: Performed by: ANESTHESIOLOGY

## 2022-06-21 PROCEDURE — 77030011267 HC ELECTRD BLD COVD -A: Performed by: SURGERY

## 2022-06-21 PROCEDURE — 77030040361 HC SLV COMPR DVT MDII -B: Performed by: SURGERY

## 2022-06-21 PROCEDURE — 76010000149 HC OR TIME 1 TO 1.5 HR: Performed by: SURGERY

## 2022-06-21 PROCEDURE — 74011000250 HC RX REV CODE- 250: Performed by: SURGERY

## 2022-06-21 PROCEDURE — C1781 MESH (IMPLANTABLE): HCPCS | Performed by: SURGERY

## 2022-06-21 PROCEDURE — 76210000006 HC OR PH I REC 0.5 TO 1 HR: Performed by: SURGERY

## 2022-06-21 PROCEDURE — 77030040922 HC BLNKT HYPOTHRM STRY -A: Performed by: SURGERY

## 2022-06-21 PROCEDURE — 77030010509 HC AIRWY LMA MSK TELE -A: Performed by: ANESTHESIOLOGY

## 2022-06-21 PROCEDURE — 76210000020 HC REC RM PH II FIRST 0.5 HR: Performed by: SURGERY

## 2022-06-21 PROCEDURE — 77030031139 HC SUT VCRL2 J&J -A: Performed by: SURGERY

## 2022-06-21 PROCEDURE — 00830 ANES HERNIA RPR LWR ABD NOS: CPT | Performed by: ANESTHESIOLOGY

## 2022-06-21 PROCEDURE — 74011250636 HC RX REV CODE- 250/636: Performed by: SURGERY

## 2022-06-21 DEVICE — MESH SURG W3.5XL6IN POLY SELF FIXATING RECT W/ RESRB PLA: Type: IMPLANTABLE DEVICE | Site: GROIN | Status: FUNCTIONAL

## 2022-06-21 RX ORDER — SODIUM CHLORIDE 0.9 % (FLUSH) 0.9 %
5-40 SYRINGE (ML) INJECTION EVERY 8 HOURS
Status: DISCONTINUED | OUTPATIENT
Start: 2022-06-21 | End: 2022-06-21 | Stop reason: HOSPADM

## 2022-06-21 RX ORDER — ONDANSETRON 2 MG/ML
4 INJECTION INTRAMUSCULAR; INTRAVENOUS
Status: COMPLETED | OUTPATIENT
Start: 2022-06-21 | End: 2022-06-21

## 2022-06-21 RX ORDER — FENTANYL CITRATE 50 UG/ML
INJECTION, SOLUTION INTRAMUSCULAR; INTRAVENOUS AS NEEDED
Status: DISCONTINUED | OUTPATIENT
Start: 2022-06-21 | End: 2022-06-21 | Stop reason: HOSPADM

## 2022-06-21 RX ORDER — HYDROMORPHONE HYDROCHLORIDE 1 MG/ML
0.5 INJECTION, SOLUTION INTRAMUSCULAR; INTRAVENOUS; SUBCUTANEOUS
Status: DISCONTINUED | OUTPATIENT
Start: 2022-06-21 | End: 2022-06-21 | Stop reason: HOSPADM

## 2022-06-21 RX ORDER — SODIUM CHLORIDE 0.9 % (FLUSH) 0.9 %
5-40 SYRINGE (ML) INJECTION AS NEEDED
Status: DISCONTINUED | OUTPATIENT
Start: 2022-06-21 | End: 2022-06-21 | Stop reason: HOSPADM

## 2022-06-21 RX ORDER — PROPOFOL 10 MG/ML
INJECTION, EMULSION INTRAVENOUS AS NEEDED
Status: DISCONTINUED | OUTPATIENT
Start: 2022-06-21 | End: 2022-06-21 | Stop reason: HOSPADM

## 2022-06-21 RX ORDER — SODIUM CHLORIDE, SODIUM LACTATE, POTASSIUM CHLORIDE, CALCIUM CHLORIDE 600; 310; 30; 20 MG/100ML; MG/100ML; MG/100ML; MG/100ML
25 INJECTION, SOLUTION INTRAVENOUS CONTINUOUS
Status: DISCONTINUED | OUTPATIENT
Start: 2022-06-21 | End: 2022-06-21 | Stop reason: HOSPADM

## 2022-06-21 RX ORDER — FAMOTIDINE 20 MG/1
20 TABLET, FILM COATED ORAL ONCE
Status: COMPLETED | OUTPATIENT
Start: 2022-06-21 | End: 2022-06-21

## 2022-06-21 RX ORDER — LIDOCAINE HYDROCHLORIDE 20 MG/ML
INJECTION, SOLUTION EPIDURAL; INFILTRATION; INTRACAUDAL; PERINEURAL AS NEEDED
Status: DISCONTINUED | OUTPATIENT
Start: 2022-06-21 | End: 2022-06-21 | Stop reason: HOSPADM

## 2022-06-21 RX ORDER — LIDOCAINE HYDROCHLORIDE 10 MG/ML
0.1 INJECTION, SOLUTION EPIDURAL; INFILTRATION; INTRACAUDAL; PERINEURAL AS NEEDED
Status: DISCONTINUED | OUTPATIENT
Start: 2022-06-21 | End: 2022-06-21 | Stop reason: HOSPADM

## 2022-06-21 RX ORDER — HYDROCODONE BITARTRATE AND ACETAMINOPHEN 5; 325 MG/1; MG/1
1 TABLET ORAL
Qty: 20 TABLET | Refills: 0 | Status: SHIPPED | OUTPATIENT
Start: 2022-06-21 | End: 2022-06-26

## 2022-06-21 RX ORDER — ONDANSETRON 2 MG/ML
INJECTION INTRAMUSCULAR; INTRAVENOUS AS NEEDED
Status: DISCONTINUED | OUTPATIENT
Start: 2022-06-21 | End: 2022-06-21 | Stop reason: HOSPADM

## 2022-06-21 RX ORDER — SODIUM CHLORIDE, SODIUM LACTATE, POTASSIUM CHLORIDE, CALCIUM CHLORIDE 600; 310; 30; 20 MG/100ML; MG/100ML; MG/100ML; MG/100ML
75 INJECTION, SOLUTION INTRAVENOUS CONTINUOUS
Status: DISCONTINUED | OUTPATIENT
Start: 2022-06-21 | End: 2022-06-21 | Stop reason: HOSPADM

## 2022-06-21 RX ORDER — MIDAZOLAM HYDROCHLORIDE 1 MG/ML
INJECTION, SOLUTION INTRAMUSCULAR; INTRAVENOUS AS NEEDED
Status: DISCONTINUED | OUTPATIENT
Start: 2022-06-21 | End: 2022-06-21 | Stop reason: HOSPADM

## 2022-06-21 RX ADMIN — ONDANSETRON 4 MG: 2 INJECTION INTRAMUSCULAR; INTRAVENOUS at 12:29

## 2022-06-21 RX ADMIN — HYDROMORPHONE HYDROCHLORIDE 0.5 MG: 1 INJECTION, SOLUTION INTRAMUSCULAR; INTRAVENOUS; SUBCUTANEOUS at 13:13

## 2022-06-21 RX ADMIN — LIDOCAINE HYDROCHLORIDE 100 MG: 20 INJECTION, SOLUTION EPIDURAL; INFILTRATION; INTRACAUDAL; PERINEURAL at 11:41

## 2022-06-21 RX ADMIN — FAMOTIDINE 20 MG: 20 TABLET ORAL at 10:29

## 2022-06-21 RX ADMIN — ONDANSETRON 4 MG: 2 INJECTION INTRAMUSCULAR; INTRAVENOUS at 13:13

## 2022-06-21 RX ADMIN — PROPOFOL 150 MG: 10 INJECTION, EMULSION INTRAVENOUS at 11:41

## 2022-06-21 RX ADMIN — SODIUM CHLORIDE, SODIUM LACTATE, POTASSIUM CHLORIDE, AND CALCIUM CHLORIDE 25 ML/HR: 600; 310; 30; 20 INJECTION, SOLUTION INTRAVENOUS at 10:35

## 2022-06-21 RX ADMIN — FENTANYL CITRATE 50 MCG: 50 INJECTION, SOLUTION INTRAMUSCULAR; INTRAVENOUS at 11:48

## 2022-06-21 RX ADMIN — CEFAZOLIN SODIUM 2 G: 1 INJECTION, POWDER, FOR SOLUTION INTRAMUSCULAR; INTRAVENOUS at 11:45

## 2022-06-21 RX ADMIN — FENTANYL CITRATE 50 MCG: 50 INJECTION, SOLUTION INTRAMUSCULAR; INTRAVENOUS at 11:36

## 2022-06-21 RX ADMIN — MIDAZOLAM HYDROCHLORIDE 2 MG: 2 INJECTION, SOLUTION INTRAMUSCULAR; INTRAVENOUS at 11:36

## 2022-06-21 NOTE — ANESTHESIA PREPROCEDURE EVALUATION
Relevant Problems   No relevant active problems       Anesthetic History   No history of anesthetic complications            Review of Systems / Medical History  Patient summary reviewed and pertinent labs reviewed    Pulmonary  Within defined limits                 Neuro/Psych   Within defined limits           Cardiovascular    Hypertension: well controlled              Exercise tolerance: >4 METS     GI/Hepatic/Renal                Endo/Other        Arthritis     Other Findings              Physical Exam    Airway  Mallampati: II  TM Distance: 4 - 6 cm  Neck ROM: decreased range of motion   Mouth opening: Normal     Cardiovascular    Rhythm: regular  Rate: normal         Dental    Dentition: Edentulous     Pulmonary  Breath sounds clear to auscultation               Abdominal  GI exam deferred       Other Findings            Anesthetic Plan    ASA: 2  Anesthesia type: general          Induction: Intravenous  Anesthetic plan and risks discussed with: Patient

## 2022-06-21 NOTE — ANESTHESIA POSTPROCEDURE EVALUATION
Procedure(s):  OPEN RIGHT INGUINAL HERNIA REPAIR WITH PLACEMENT OF MESH. general    Anesthesia Post Evaluation      Multimodal analgesia: multimodal analgesia used between 6 hours prior to anesthesia start to PACU discharge  Patient location during evaluation: bedside  Patient participation: complete - patient participated  Level of consciousness: awake  Pain management: adequate  Airway patency: patent  Anesthetic complications: no  Cardiovascular status: stable  Respiratory status: acceptable  Hydration status: acceptable  Post anesthesia nausea and vomiting:  controlled      INITIAL Post-op Vital signs:   Vitals Value Taken Time   /82 06/21/22 1340   Temp     Pulse 73 06/21/22 1343   Resp 22 06/21/22 1343   SpO2 100 % 06/21/22 1343   Vitals shown include unvalidated device data.

## 2022-06-21 NOTE — DISCHARGE INSTRUCTIONS
Post Operative Discharge Instructions    1. No driving for 24 hours after surgery and off of prescription pain medication. 2. Avoid activities that bump or cause jarring movements at the surgical site for 10 days. 3. No lifting more than 10-15 pounds for 6 weeks after surgery or until cleared for activity at your follow up.    4. Walking is encouraged after surgery. 5. Stairs are ok to climb. DIET:     Diet as tolerated. Start with liquids then advance your diet based on how you fell.  No alcoholic beverages for 24 hours after surgery or while on antibiotics or pain mdications.  Drink plenty of water. MEDICATIONS:     Use daily stool softners (over the counter such as Colace or Senekot) while on pain medications.  Resume pre-operative medications. If you are on any blood thinners see special instructions below.  Use prescriptions given or Tylenol, Ibuprofen as needed for pain.  Do not use more than 4000mg of Tylenol (acetaminophen) per day. Be aware this may be  in your prescription medication as well.  Be aware narcotic prescriptions are tightly controlled in the state of [place]. If requiring more than one refill, a follow up appointment will be required. WOUND CARE:       You have skin glue on your incision, you may shower in 24 hours and pat dry. Glue will fall off on it's own     Do not tub bathe, swim, or soak incisions until cleared to do so at your follow up.  Ice bag to the affected area; 20 minutes on and 20 minutes off if desired. FOLLOW UP CARE:     You should have an appointment scheduled within 14 days after surgery. If this is not yet scheduled, call the office.  Any forms that you need filled out regarding your medical care can be brought to the office at follow up appointment of faxed to: [id number] [employer] IF:   Temperature is over 101 degrees, a slight fever can be normal 24-48 hour after surgery.    Nausea & vomiting that persists more than 24 hours after surgery.  Your wound appears very red, hot, painful or swollen.  Excessive bleeding occurs form the incision. *Between the hours 9-5 Monday-Friday please call the office at [phone]. If you do not receive a call back the same day, please do not hesitate to call my cell phone at [phone]    *If there is a medical emergency please go to the nearest emergency room immediately and do not hesitate to call my cell phone    *Weekends, after hours please call my cell phone    Patient Education        Inguinal Hernia Repair Surgery: What to Expect at Home  Your Recovery     After surgery to repair a hernia, you're likely to have pain for a few days. You may also feel tired and have less energy than normal. This is common. You should start to feel better after a few days. And you'll probably feel much better in 7 days. For a few weeks you may feel discomfort or pulling in the groin area when you move. You may have some bruising near the repair site and on your genitals. This is normal.  This care sheet gives you a general idea about how long it will take for you to recover. But each person recovers at a different pace. Follow the steps below to get better as quickly as possible. How can you care for yourself at home? Activity    · Rest when you feel tired.     · You may shower 24 to 48 hours after surgery, if your doctor okays it. Pat the incision dry. Do not take a bath for the first 2 weeks, or until your doctor tells you it is okay.     · Allow the area to heal. Don't move quickly or lift anything heavy until you are feeling better.     · Be active. Walking is a good choice.     · You most likely can return to light activity after 1 to 3 weeks, depending on the type of surgery you had. Diet    · You can eat your normal diet.  If your stomach is upset, try bland, low-fat foods like plain rice, broiled chicken, toast, and yogurt.     · If your bowel movements are not regular right after surgery, try to avoid constipation and straining. Drink plenty of water. Your doctor may suggest fiber, a stool softener, or a mild laxative. Medicines    · Be safe with medicines. Read and follow all instructions on the label. ? If the doctor gave you a prescription medicine for pain, take it as prescribed. ? If you are not taking a prescription pain medicine, ask your doctor if you can take an over-the-counter medicine.     · Your doctor will tell you if and when you can restart your medicines. He or she will also give you instructions about taking any new medicines. Incision care    · You will have a dressing over the cut (incision). A dressing helps the cut heal and protects it. Your doctor will tell you how to take care of this.     · If you have skin adhesive on the cut, leave it on until it falls off. Skin adhesive is also called liquid stitches or glue.     · If you have strips of tape on the cut, leave the tape on for a week or until it falls off.     · If you had stitches, your doctor will tell you when to come back to have them removed.     · Wash the area daily with warm, soapy water, and pat it dry. Don't use hydrogen peroxide or alcohol. They can slow healing. Ice    · Put ice or a cold pack on the area for 10 to 20 minutes at a time. Try to do this every 1 to 2 hours for the next 3 days (when you are awake) or until the swelling goes down. Put a thin cloth between the ice and your skin. Follow-up care is a key part of your treatment and safety. Be sure to make and go to all appointments, and call your doctor if you are having problems. It's also a good idea to know your test results and keep a list of the medicines you take. When should you call for help? Call 911 anytime you think you may need emergency care. For example, call if:    · You passed out (lost consciousness).     · You are short of breath.    Call your doctor now or seek immediate medical care if:    · You have pain that does not get better after you take pain medicine.     · You have loose stitches, or your incision comes open.     · Bright red blood has soaked through your bandage.     · You are sick to your stomach or cannot drink fluids.     · You have signs of a blood clot in your leg (called a deep vein thrombosis), such as:  ? Pain in your calf, back of the knee, thigh, or groin. ? Redness and swelling in your leg or groin.     · You cannot pass stools or gas.     · You have symptoms of infection, such as:  ? Increased pain, swelling, warmth, or redness. ? Red streaks leading from the incision. ? Pus draining from the incision. ? A fever. Watch closely for changes in your health, and be sure to contact your doctor if you have any problems. Where can you learn more? Go to http://www.gray.com/  Enter D758 in the search box to learn more about \"Inguinal Hernia Repair Surgery: What to Expect at Home. \"  Current as of: September 8, 2021               Content Version: 13.2  © 2006-2022 [employer]. Care instructions adapted under license by [employer] (which disclaims liability or warranty for this information). If you have questions about a medical condition or this instruction, always ask your healthcare professional. [address] any warranty or liability for your use of this information. DISCHARGE SUMMARY from Nurse    PATIENT INSTRUCTIONS:    After general anesthesia or intravenous sedation, for 24 hours or while taking prescription Narcotics:  · Limit your activities  · Do not drive and operate hazardous machinery  · Do not make important personal or business decisions  · Do  not drink alcoholic beverages  · If you have not urinated within 8 hours after discharge, please contact your surgeon on call.     Report the following to your surgeon:  · Excessive pain, swelling, redness or odor of or around the surgical area  · Temperature over 100.5  · Nausea and vomiting lasting longer than 4 hours or if unable to take medications  · Any signs of decreased circulation or nerve impairment to extremity: change in color, persistent  numbness, tingling, coldness or increase pain  · Any questions    What to do at Home:      *  Please give a list of your current medications to your Primary Care Provider. *  Please update this list whenever your medications are discontinued, doses are      changed, or new medications (including over-the-counter products) are added. *  Please carry medication information at all times in case of emergency situations. These are general instructions for a healthy lifestyle:    No smoking/ No tobacco products/ Avoid exposure to second hand smoke  Surgeon General's Warning:  Quitting smoking now greatly reduces serious risk to your health. Obesity, smoking, and sedentary lifestyle greatly increases your risk for illness    A healthy diet, regular physical exercise & weight monitoring are important for maintaining a healthy lifestyle    You may be retaining fluid if you have a history of heart failure or if you experience any of the following symptoms:  Weight gain of 3 pounds or more overnight or 5 pounds in a week, increased swelling in our hands or feet or shortness of breath while lying flat in bed. Please call your doctor as soon as you notice any of these symptoms; do not wait until your next office visit. The discharge information has been reviewed with the patient. The patient verbalized understanding. Discharge medications reviewed with the patient and appropriate educational materials and side effects teaching were provided.   ___________________________________________________________________________________________________________________________________

## 2022-06-21 NOTE — OP NOTES
Patient Name: Alexey Velázquez     SURGERY DATE: 22     : 1955     AGE: 74 y.o. Anesthesiologist: Anesthesiologist: Neptali Ng MD  CRNA: Kylee Viramonets CRNA     Anesthesia: General     PreOp DX: Right inguinal hernia [K40.90]     PostOp DX: same    Procedure: Open right inguinal hernia repair with mesh    Procedure Details: After informed consent was obtained the patient was taken to the operating room and placed in the supine position. General anesthesia was administered by the anesthetist to titrate to effect. The right groin was prepped and draped in the usual sterile fashion and a timeout procedure was performed. Next using a 15 blade scalpel an oblique incision was made superior to the inguinal ligament. Bovie electrocautery was used to dissect through campers and twan's fascia. The external oblique aponeurosis was then sharply incised through the external ring. A penrose drain was then placed around the spermatic cord and contents. A very large hernia sac was then  from the spermatic cord and reduced back into the abdomen after using a combination of blunt and bovie dissection. The inguinal floor was very patulous and reapproximated with 0 ethibond suture to ensure contents stayed reduced. A piece of progrip mesh was then cut to fit widely over the floor, pubic tubercle and then around the cord. It was secured at the pubic tubercle and inguinal ligament and internal oblique. The wound was irrigated and suctioned dry and found to be hemostatic. The external oblique aponeurosis was then closed with a 2-0 vicryl suture recreating the external ring. Scarpas fascia was closed with 2-0 vicryl in a simple interrupted fashion. The deep dermis was reapproximated with 3-0 vicryl in a simple interrupted fashion. The skin incision was then closed with 4-0 Monocryl in a subcuticular manner. Dermabond dressing was then applied.   The patient was subsequently extubated, tolerated the procedure well and sent to recovery in stable condition.       Estimated Blood Loss:  10cc    Specimens: none           Complications: None           Disposition: extubated, tolerated procedure well            Condition: Stable    [place] Reason, DO

## 2022-06-21 NOTE — INTERVAL H&P NOTE
Update History & Physical    The Patient's History and Physical of June 21, 2022 was reviewed with the patient and I examined the patient. There was no change. The surgical site was confirmed by the patient and me. Plan:  The risk, benefits, expected outcome, and alternative to the recommended procedure have been discussed with the patient. Patient understands and wants to proceed with the procedure.     Electronically signed by Sebastian Pryor DO on 6/21/2022 at 10:55 AM

## 2022-06-30 ENCOUNTER — TELEPHONE (OUTPATIENT)
Dept: SURGERY | Age: 67
End: 2022-06-30

## 2022-06-30 NOTE — TELEPHONE ENCOUNTER
Spoke with patient regarding a request for pain medication and he states he is going to take tylenol for pain as directed

## 2022-07-11 ENCOUNTER — OFFICE VISIT (OUTPATIENT)
Dept: SURGERY | Age: 67
End: 2022-07-11
Payer: MEDICAID

## 2022-07-11 VITALS
HEIGHT: 74 IN | SYSTOLIC BLOOD PRESSURE: 147 MMHG | WEIGHT: 175 LBS | OXYGEN SATURATION: 98 % | TEMPERATURE: 97.8 F | DIASTOLIC BLOOD PRESSURE: 91 MMHG | BODY MASS INDEX: 22.46 KG/M2 | RESPIRATION RATE: 16 BRPM | HEART RATE: 112 BPM

## 2022-07-11 DIAGNOSIS — Z98.890 S/P RIGHT INGUINAL HERNIA REPAIR: Primary | ICD-10-CM

## 2022-07-11 DIAGNOSIS — Z87.19 S/P RIGHT INGUINAL HERNIA REPAIR: Primary | ICD-10-CM

## 2022-07-11 PROCEDURE — 99024 POSTOP FOLLOW-UP VISIT: CPT | Performed by: SURGERY

## 2022-07-11 NOTE — PROGRESS NOTES
CC:   Chief Complaint   Patient presents with    Post OP Follow Up     Open right inguinal hernia repair with mesh 6/21/22        Assessment:    ICD-10-CM ICD-9-CM    1. S/P right inguinal hernia repair  Z98.890 V45.89     Z87.19         Plan: He is doing well from surgery with no complaints today. He is to continue with lifting restrictions for another 4 weeks then may resume activity as tolerated after that time. There is no need for additional follow up unless he has questions or concerns in the future. HPI:  Jordi Da Silva is a 74 y.o. male who underwent open right inguinal hernia repair with mesh on 6/21/2022. Overall he is doing well since surgery with no complaints. He denies any pain, fevers, chills or drainage from his incision. He is voiding and moving his bowels without difficulty. Allergies: Allergies   Allergen Reactions    Aspirin Unknown (comments)       Medication Review:  Current Outpatient Medications on File Prior to Visit   Medication Sig Dispense Refill    risperiDONE (RisperDAL) 2 mg tablet 1 tablet      losartan (COZAAR) 50 mg tablet Take 50 mg by mouth daily.  methocarbamoL (Robaxin) 500 mg tablet Take 1 Tab by mouth four (4) times daily. (Patient not taking: Reported on 5/23/2022) 12 Tab 0     No current facility-administered medications on file prior to visit. Systems Review:  Review of Systems   Constitutional: Negative for fatigue and fever. Cardiovascular: Negative for chest pain and palpitations. Gastrointestinal: Negative for abdominal pain.        PMH:  Past Medical History:   Diagnosis Date    Hypertension     Mental and behavioral problem     Schizophrenia ([facility] [address]        Surgical History:  Past Surgical History:   Procedure Laterality Date    HX HERNIA REPAIR Left     20 years ago       Social History:  Social History     Socioeconomic History    Marital status: SINGLE   Tobacco Use    Smoking status: Former Smoker     Quit date: 4/1/2022     Years since quittin.2    Smokeless tobacco: Never Used   Vaping Use    Vaping Use: Never used   Substance and Sexual Activity    Alcohol use: Never    Drug use: Not Currently     Types: Marijuana       Family History:  History reviewed. No pertinent family history. Admission on 2022, Discharged on 2022   Component Date Value Ref Range Status    BENZODIAZEPINES 2022 Negative  NEG   Final    BARBITURATES 2022 Negative  NEG   Final    THC (TH-CANNABINOL) 2022 Negative  NEG   Final    OPIATES 2022 Negative  NEG   Final    PCP(PHENCYCLIDINE) 2022 Negative  NEG   Final    COCAINE 2022 Negative  NEG   Final    AMPHETAMINES 2022 Negative  NEG   Final    METHADONE 2022 Negative  NEG   Final    HDSCOM 2022 (NOTE)   Final    Comment: Specimen analysis was performed without chain of custody handling. These results should be used for medical purposes only and not for   legal or employment purposes. Unconfirmed screening results must not   be used for non-medical purposes.     The cut-off concentration for positive results are as follows:    AMPH     1000 ng/mL  YAN      200 ng/mL  VERONICA      200 ng/mL  ORIANA       300 ng/mL  METH      300 ng/mL  OPI       300 ng/mL  PCP        25 ng/mL  THC        50 ng/mL       Orders Only on 2022   Component Date Value Ref Range Status    WBC 2022 6.4  4.0 - 11.0 K/uL Final    RBC 2022 4.57  3.80 - 5.80 M/uL Final    HGB 2022 14.0  12.6 - 17.1 g/dL Final    HCT 2022 42.2  37.8 - 52.2 % Final    MCV 2022 92  80 - 95 fL Final    MCH 2022 31  26 - 34 pg Final    MCHC 2022 33  31 - 36 g/dL Final    RDW 2022 14.3  10.0 - 15.5 % Final    PLATELET  391  140 - 440 K/uL Final    MPV 2022 10.1  9.0 - 13.0 fL Final    NEUTROPHILS 2022 63  40 - 75 % Final    Lymphocytes 2022 27  20 - 45 % Final    MONOCYTES 2022 7  3 - 12 % Final    EOSINOPHILS 05/23/2022 2  0 - 6 % Final    BASOPHILS 05/23/2022 1  0 - 2 % Final    ABS. NEUTROPHILS 05/23/2022 4.0  1.8 - 7.7 K/uL Final    ABSOLUTE LYMPHOCYTE COUNT 05/23/2022 1.7  1.0 - 4.8 K/uL Final    ABS. MONOCYTES 05/23/2022 0.5  0.1 - 1.0 K/uL Final    ABS. EOSINOPHILS 05/23/2022 0.1  0.0 - 0.5 K/uL Final    ABS. BASOPHILS 05/23/2022 0.0  0.0 - 0.2 K/uL Final    Glucose 05/23/2022 87  70 - 99 mg/dL Final    BUN 05/23/2022 11  6 - 22 mg/dL Final    Creatinine 05/23/2022 0.9  0.8 - 1.6 mg/dL Final    Sodium 05/23/2022 139  133 - 145 mmol/L Final    Potassium 05/23/2022 4.2  3.5 - 5.5 mmol/L Final    Chloride 05/23/2022 103  98 - 110 mmol/L Final    CO2 05/23/2022 27  20 - 32 mmol/L Final    AST (SGOT) 05/23/2022 12  10 - 37 U/L Final    ALT (SGPT) 05/23/2022 12  5 - 40 U/L Final    Alk. phosphatase 05/23/2022 57  40 - 125 U/L Final    Bilirubin, total 05/23/2022 0.3  0.2 - 1.2 mg/dL Final    Calcium 05/23/2022 8.9  8.4 - 10.5 mg/dL Final    Protein, total 05/23/2022 6.4  6.2 - 8.1 g/dL Final    Albumin 05/23/2022 4.2  3.5 - 5.0 g/dL Final    A-G Ratio 05/23/2022 1.9  1.1 - 2.6 ratio Final    Globulin 05/23/2022 2.2  2.0 - 4.0 g/dL Final    Anion gap 05/23/2022 9.0  3.0 - 15.0 mmol/L Final    Comment: Anion Gap calculation based on electrolyte reference ranges. Test includes Albumin, Alkaline Phosphatase, ALT, AST, BUN, Calcium, CO2,  Chloride, Creatinine, Glucose, Potassium, Sodium, Total Bilirubin and Total  Protein.  GLOMERULAR FILTRATION RATE 05/23/2022 >60.0  >60.0 mL/min/1.73 sq.m. Final    Comment: eGFR calculation based on the Chronic Kidney Disease Epidemiology   Collaboration  (CKD-EPI) equation refit without adjustment for race. This eGFR is validated for stable chronic renal failure patients. This   equation  is unreliable in acute illness or patients with normal renal function.        Hospital Outpatient Visit on 05/23/2022   Component Date Value Ref Range Status    Ventricular Rate 05/23/2022 99  BPM Final    Atrial Rate 05/23/2022 99  BPM Final    P-R Interval 05/23/2022 140  ms Final    QRS Duration 05/23/2022 74  ms Final    Q-T Interval 05/23/2022 356  ms Final    QTC Calculation (Bezet) 05/23/2022 456  ms Final    Calculated P Axis 05/23/2022 73  degrees Final    Calculated R Axis 05/23/2022 1  degrees Final    Calculated T Axis 05/23/2022 54  degrees Final    Diagnosis 05/23/2022    Final                    Value:Normal sinus rhythm  Normal ECG  No previous ECGs available  Confirmed by Loren Smith MD, --- ([id number]) on 5/24/2022 12:31:02 PM     Hospital Outpatient Visit on 05/23/2022   Component Date Value Ref Range Status    [facility] SPECIMEN COL 05/23/2022 Specimens collected/sent to [facility]    Final       No image results found. Physical Exam:  Visit Vitals  BP (!) 147/91 (BP 1 Location: Left arm)   Pulse (!) 112   Temp 97.8 °F (36.6 °C)   Resp 16   Ht 6' 2\" (1.88 m)   Wt 79.4 kg (175 lb)   SpO2 98%   BMI 22.47 kg/m²    BMI: Body mass index is 22.47 kg/m². Physical Exam  Constitutional:       Appearance: Normal appearance. He is normal weight. Abdominal:      General: Abdomen is flat. Bowel sounds are normal.      Palpations: Abdomen is soft. Hernia: No hernia is present. Comments: Right inguinal incision well healed, non tender, no recurrent hernia   Neurological:      Mental Status: He is alert. I have reviewed the information entered by the clinical staff and/or patient and verified it as accurate or edited where necessary.      Electronically signed by:    Joleen Quigley DO, MPH

## 2022-07-11 NOTE — PROGRESS NOTES
Yolanda Araujo is a 74 y.o. male (: 1955) presenting to address:    Chief Complaint   Patient presents with    Post OP Follow Up     Open right inguinal hernia repair with mesh 22       Medication list and allergies have been reviewed with Yolanda Araujo and updated as of today's date. I have gone over all Medical, Surgical and Social History with Yolanda Araujo and updated/added the information accordingly. 1. Have you been to the ER, urgent care clinic since your last visit? Hospitalized since your last visit? No    2. Have you seen or consulted any other health care providers outside of the [address] [place] [address] since your last visit? Include any pap smears or colon screening.  No

## (undated) DEVICE — SPONGE DISSECT PNUT SM 3/8IN -- 5/PK

## (undated) DEVICE — GLOVE SURG SZ 65 L12IN FNGR THK79MIL GRN LTX FREE

## (undated) DEVICE — MASTISOL ADHESIVE LIQ 2/3ML

## (undated) DEVICE — SUTURE ETHBND EXCEL SZ 0 L18IN NONABSORBABLE GRN L36MM CT-1 CX21D

## (undated) DEVICE — GARMENT,MEDLINE,DVT,INT,CALF,MED, GEN2: Brand: MEDLINE

## (undated) DEVICE — SUTURE MCRYL SZ 4-0 L27IN ABSRB UD L24MM PS-1 3/8 CIR PRIM Y935H

## (undated) DEVICE — 3M™ STERI-STRIP™ REINFORCED ADHESIVE SKIN CLOSURES, R1549, 1/2 IN X 2 IN (12 MM X 50 MM), 6 STRIPS/ENVELOPE: Brand: 3M™ STERI-STRIP™

## (undated) DEVICE — SUTURE VCRL SZ 3-0 L27IN ABSRB UD L26MM SH 1/2 CIR J416H

## (undated) DEVICE — BLANKET WRM AD W50XL85.8IN PACU FULL BODY FORC AIR

## (undated) DEVICE — SUPPORT SCROT XL WHT COT ATH W/ LEG STRP ADJ E WAISTBAND

## (undated) DEVICE — PACK PROCEDURE SURG MAJ W/ BASIN LF

## (undated) DEVICE — SUTURE VCRL SZ 2-0 L27IN ABSRB UD L26MM SH 1/2 CIR J417H

## (undated) DEVICE — KIT CLN UP BON SECOURS MARYV

## (undated) DEVICE — INSULATED BLADE ELECTRODE: Brand: EDGE

## (undated) DEVICE — REM POLYHESIVE ADULT PATIENT RETURN ELECTRODE: Brand: VALLEYLAB

## (undated) DEVICE — DRAPE TOWEL: Brand: CONVERTORS

## (undated) DEVICE — GLOVE SURG SZ 6 CRM LTX FREE POLYISOPRENE POLYMER BEAD ANTI

## (undated) DEVICE — SYSTEM SKIN CLSR 22CM DERMBND PRINEO

## (undated) DEVICE — PENROSE TUBING RADIOPAQUE: Brand: ARGYLE

## (undated) DEVICE — DRAPE,TOP,102X53,STERILE: Brand: MEDLINE

## (undated) DEVICE — DRAPE,REIN 53X77,STERILE: Brand: MEDLINE

## (undated) DEVICE — SOLUTION IV 1000ML 0.9% SOD CHL

## (undated) DEVICE — INTENDED FOR TISSUE SEPARATION, AND OTHER PROCEDURES THAT REQUIRE A SHARP SURGICAL BLADE TO PUNCTURE OR CUT.: Brand: BARD-PARKER SAFETY BLADES SIZE 15, STERILE